# Patient Record
Sex: MALE | Race: WHITE | Employment: OTHER | URBAN - METROPOLITAN AREA
[De-identification: names, ages, dates, MRNs, and addresses within clinical notes are randomized per-mention and may not be internally consistent; named-entity substitution may affect disease eponyms.]

---

## 2019-04-02 ENCOUNTER — HOSPITAL ENCOUNTER (OUTPATIENT)
Dept: VASCULAR LAB | Age: 74
Discharge: HOME | End: 2019-04-02
Payer: MEDICARE

## 2019-04-02 PROCEDURE — 93924 LWR XTR VASC STDY BILAT: CPT

## 2019-08-02 ENCOUNTER — HOSPITAL ENCOUNTER (OUTPATIENT)
Dept: MAMMOGRAPHY | Age: 74
Discharge: HOME | End: 2019-08-04
Payer: MEDICARE

## 2019-08-02 DIAGNOSIS — Z12.31 VISIT FOR SCREENING MAMMOGRAM: ICD-10-CM

## 2019-08-02 DIAGNOSIS — M85.89 OTHER SPECIFIED DISORDERS OF BONE DENSITY AND STRUCTURE, MULTIPLE SITES: ICD-10-CM

## 2019-08-02 PROCEDURE — 77080 DXA BONE DENSITY AXIAL: CPT

## 2019-09-17 ENCOUNTER — APPOINTMENT (OUTPATIENT)
Dept: CT IMAGING | Age: 74
End: 2019-09-17
Payer: MEDICARE

## 2019-09-17 ENCOUNTER — HOSPITAL ENCOUNTER (EMERGENCY)
Age: 74
Discharge: HOME | End: 2019-09-17
Attending: EMERGENCY MEDICINE
Payer: MEDICARE

## 2019-09-17 ENCOUNTER — APPOINTMENT (OUTPATIENT)
Dept: GENERAL RADIOLOGY | Age: 74
End: 2019-09-17
Payer: MEDICARE

## 2019-09-17 VITALS
TEMPERATURE: 98.2 F | RESPIRATION RATE: 14 BRPM | WEIGHT: 190 LBS | SYSTOLIC BLOOD PRESSURE: 133 MMHG | OXYGEN SATURATION: 95 % | BODY MASS INDEX: 27.2 KG/M2 | HEIGHT: 70 IN | DIASTOLIC BLOOD PRESSURE: 87 MMHG | HEART RATE: 61 BPM

## 2019-09-17 DIAGNOSIS — T50.Z95A IMMUNIZATION REACTION, INITIAL ENCOUNTER: ICD-10-CM

## 2019-09-17 DIAGNOSIS — M25.511 ACUTE PAIN OF RIGHT SHOULDER: ICD-10-CM

## 2019-09-17 DIAGNOSIS — R42 DIZZINESS: Primary | ICD-10-CM

## 2019-09-17 LAB
-: ABNORMAL
ABSOLUTE EOS #: 0.3 K/UL (ref 0–0.4)
ABSOLUTE IMMATURE GRANULOCYTE: ABNORMAL K/UL (ref 0–0.3)
ABSOLUTE LYMPH #: 0.9 K/UL (ref 1–4.8)
ABSOLUTE MONO #: 0.6 K/UL (ref 0.1–1.2)
AMORPHOUS: ABNORMAL
ANION GAP SERPL CALCULATED.3IONS-SCNC: 12 MMOL/L (ref 9–17)
BACTERIA: ABNORMAL
BASOPHILS # BLD: 1 % (ref 0–2)
BASOPHILS ABSOLUTE: 0.1 K/UL (ref 0–0.2)
BILIRUBIN URINE: NEGATIVE
BUN BLDV-MCNC: 22 MG/DL (ref 8–23)
BUN/CREAT BLD: ABNORMAL (ref 9–20)
CALCIUM SERPL-MCNC: 9.3 MG/DL (ref 8.6–10.4)
CASTS UA: ABNORMAL /LPF
CHLORIDE BLD-SCNC: 102 MMOL/L (ref 98–107)
CO2: 26 MMOL/L (ref 20–31)
COLOR: YELLOW
COMMENT UA: ABNORMAL
CREAT SERPL-MCNC: 0.96 MG/DL (ref 0.7–1.2)
CRYSTALS, UA: ABNORMAL /HPF
DIFFERENTIAL TYPE: ABNORMAL
EOSINOPHILS RELATIVE PERCENT: 4 % (ref 1–4)
EPITHELIAL CELLS UA: ABNORMAL /HPF
GFR AFRICAN AMERICAN: >60 ML/MIN
GFR NON-AFRICAN AMERICAN: >60 ML/MIN
GFR SERPL CREATININE-BSD FRML MDRD: ABNORMAL ML/MIN/{1.73_M2}
GFR SERPL CREATININE-BSD FRML MDRD: ABNORMAL ML/MIN/{1.73_M2}
GLUCOSE BLD-MCNC: 102 MG/DL (ref 70–99)
GLUCOSE URINE: NEGATIVE
HCT VFR BLD CALC: 46.8 % (ref 41–53)
HEMOGLOBIN: 15.9 G/DL (ref 13.5–17.5)
IMMATURE GRANULOCYTES: ABNORMAL %
KETONES, URINE: ABNORMAL
LEUKOCYTE ESTERASE, URINE: NEGATIVE
LYMPHOCYTES # BLD: 15 % (ref 24–44)
MCH RBC QN AUTO: 32.8 PG (ref 26–34)
MCHC RBC AUTO-ENTMCNC: 34 G/DL (ref 31–37)
MCV RBC AUTO: 96.5 FL (ref 80–100)
MONOCYTES # BLD: 11 % (ref 2–11)
MUCUS: ABNORMAL
NITRITE, URINE: NEGATIVE
NRBC AUTOMATED: ABNORMAL PER 100 WBC
OTHER OBSERVATIONS UA: ABNORMAL
PDW BLD-RTO: 13.2 % (ref 12.5–15.4)
PH UA: 5.5
PLATELET # BLD: 166 K/UL (ref 140–450)
PLATELET ESTIMATE: ABNORMAL
PMV BLD AUTO: 8.2 FL (ref 6–12)
POTASSIUM SERPL-SCNC: 3.9 MMOL/L (ref 3.7–5.3)
PROTEIN UA: ABNORMAL
RBC # BLD: 4.85 M/UL (ref 4.5–5.9)
RBC # BLD: ABNORMAL 10*6/UL
RBC UA: ABNORMAL /HPF
RENAL EPITHELIAL, UA: ABNORMAL /HPF
SEG NEUTROPHILS: 69 % (ref 36–66)
SEGMENTED NEUTROPHILS ABSOLUTE COUNT: 4.2 K/UL (ref 1.8–7.7)
SODIUM BLD-SCNC: 140 MMOL/L (ref 135–144)
SPECIFIC GRAVITY UA: 1.02
TRICHOMONAS: ABNORMAL
TROPONIN INTERP: NORMAL
TROPONIN T: NORMAL NG/ML
TROPONIN, HIGH SENSITIVITY: 10 NG/L (ref 0–22)
TURBIDITY: CLEAR
URINE HGB: NEGATIVE
UROBILINOGEN, URINE: NORMAL
WBC # BLD: 6.1 K/UL (ref 3.5–11)
WBC # BLD: ABNORMAL 10*3/UL
WBC UA: ABNORMAL /HPF
YEAST: ABNORMAL

## 2019-09-17 PROCEDURE — 99284 EMERGENCY DEPT VISIT MOD MDM: CPT

## 2019-09-17 PROCEDURE — 6360000002 HC RX W HCPCS: Performed by: EMERGENCY MEDICINE

## 2019-09-17 PROCEDURE — 80048 BASIC METABOLIC PNL TOTAL CA: CPT

## 2019-09-17 PROCEDURE — 81001 URINALYSIS AUTO W/SCOPE: CPT

## 2019-09-17 PROCEDURE — 71045 X-RAY EXAM CHEST 1 VIEW: CPT

## 2019-09-17 PROCEDURE — 73030 X-RAY EXAM OF SHOULDER: CPT

## 2019-09-17 PROCEDURE — 85025 COMPLETE CBC W/AUTO DIFF WBC: CPT

## 2019-09-17 PROCEDURE — 93005 ELECTROCARDIOGRAM TRACING: CPT | Performed by: EMERGENCY MEDICINE

## 2019-09-17 PROCEDURE — 96374 THER/PROPH/DIAG INJ IV PUSH: CPT

## 2019-09-17 PROCEDURE — 96375 TX/PRO/DX INJ NEW DRUG ADDON: CPT

## 2019-09-17 PROCEDURE — 84484 ASSAY OF TROPONIN QUANT: CPT

## 2019-09-17 PROCEDURE — 70450 CT HEAD/BRAIN W/O DYE: CPT

## 2019-09-17 PROCEDURE — 36415 COLL VENOUS BLD VENIPUNCTURE: CPT

## 2019-09-17 PROCEDURE — 6370000000 HC RX 637 (ALT 250 FOR IP): Performed by: EMERGENCY MEDICINE

## 2019-09-17 PROCEDURE — 2580000003 HC RX 258: Performed by: EMERGENCY MEDICINE

## 2019-09-17 RX ORDER — KETOROLAC TROMETHAMINE 30 MG/ML
30 INJECTION, SOLUTION INTRAMUSCULAR; INTRAVENOUS ONCE
Status: COMPLETED | OUTPATIENT
Start: 2019-09-17 | End: 2019-09-17

## 2019-09-17 RX ORDER — MECLIZINE HYDROCHLORIDE 25 MG/1
25 TABLET ORAL 3 TIMES DAILY PRN
Qty: 20 TABLET | Refills: 0 | Status: SHIPPED | OUTPATIENT
Start: 2019-09-17

## 2019-09-17 RX ORDER — 0.9 % SODIUM CHLORIDE 0.9 %
1000 INTRAVENOUS SOLUTION INTRAVENOUS ONCE
Status: COMPLETED | OUTPATIENT
Start: 2019-09-17 | End: 2019-09-17

## 2019-09-17 RX ORDER — ONDANSETRON 2 MG/ML
4 INJECTION INTRAMUSCULAR; INTRAVENOUS ONCE
Status: COMPLETED | OUTPATIENT
Start: 2019-09-17 | End: 2019-09-17

## 2019-09-17 RX ORDER — MECLIZINE HCL 12.5 MG/1
25 TABLET ORAL ONCE
Status: COMPLETED | OUTPATIENT
Start: 2019-09-17 | End: 2019-09-17

## 2019-09-17 RX ORDER — HYDROCODONE BITARTRATE AND ACETAMINOPHEN 5; 325 MG/1; MG/1
2 TABLET ORAL ONCE
Status: COMPLETED | OUTPATIENT
Start: 2019-09-17 | End: 2019-09-17

## 2019-09-17 RX ORDER — HYDROCODONE BITARTRATE AND ACETAMINOPHEN 5; 325 MG/1; MG/1
1 TABLET ORAL EVERY 6 HOURS PRN
Qty: 10 TABLET | Refills: 0 | Status: SHIPPED | OUTPATIENT
Start: 2019-09-17 | End: 2019-09-20

## 2019-09-17 RX ADMIN — SODIUM CHLORIDE 1000 ML: 9 INJECTION, SOLUTION INTRAVENOUS at 11:33

## 2019-09-17 RX ADMIN — ONDANSETRON 4 MG: 2 INJECTION INTRAMUSCULAR; INTRAVENOUS at 11:33

## 2019-09-17 RX ADMIN — SODIUM CHLORIDE 1000 ML: 9 INJECTION, SOLUTION INTRAVENOUS at 10:08

## 2019-09-17 RX ADMIN — MECLIZINE 25 MG: 12.5 TABLET ORAL at 12:03

## 2019-09-17 RX ADMIN — HYDROCODONE BITARTRATE AND ACETAMINOPHEN 2 TABLET: 5; 325 TABLET ORAL at 12:03

## 2019-09-17 RX ADMIN — KETOROLAC TROMETHAMINE 30 MG: 30 INJECTION, SOLUTION INTRAMUSCULAR at 10:09

## 2019-09-17 ASSESSMENT — PAIN DESCRIPTION - PAIN TYPE
TYPE: ACUTE PAIN

## 2019-09-17 ASSESSMENT — PAIN DESCRIPTION - LOCATION: LOCATION: ARM

## 2019-09-17 ASSESSMENT — PAIN SCALES - GENERAL
PAINLEVEL_OUTOF10: 10
PAINLEVEL_OUTOF10: 10
PAINLEVEL_OUTOF10: 5
PAINLEVEL_OUTOF10: 10

## 2019-09-17 ASSESSMENT — PAIN DESCRIPTION - ORIENTATION: ORIENTATION: RIGHT

## 2019-09-17 ASSESSMENT — PAIN DESCRIPTION - PROGRESSION
CLINICAL_PROGRESSION: NOT CHANGED
CLINICAL_PROGRESSION: RAPIDLY IMPROVING

## 2019-09-17 NOTE — ED PROVIDER NOTES
Efforts were made to edit the dictations but occasionally words are mis-transcribed.)    Daneyl Villar,, DO  Attending Emergency Physician       [address] [place] [address], DO  09/17/19 9638

## 2019-09-17 NOTE — ED NOTES
Pt states he prefers to wait to have chest xray completed until he has had pain medication.         Karlos Guevara RN  09/17/19 5575

## 2019-09-18 LAB
EKG ATRIAL RATE: 57 BPM
EKG P AXIS: 2 DEGREES
EKG P-R INTERVAL: 224 MS
EKG Q-T INTERVAL: 410 MS
EKG QRS DURATION: 100 MS
EKG QTC CALCULATION (BAZETT): 399 MS
EKG R AXIS: -37 DEGREES
EKG T AXIS: 11 DEGREES
EKG VENTRICULAR RATE: 57 BPM

## 2023-01-04 ENCOUNTER — APPOINTMENT (OUTPATIENT)
Dept: CT IMAGING | Age: 78
End: 2023-01-04
Payer: MEDICARE

## 2023-01-04 ENCOUNTER — HOSPITAL ENCOUNTER (EMERGENCY)
Age: 78
Discharge: HOME | End: 2023-01-04
Attending: EMERGENCY MEDICINE
Payer: MEDICARE

## 2023-01-04 VITALS
WEIGHT: 184 LBS | DIASTOLIC BLOOD PRESSURE: 84 MMHG | RESPIRATION RATE: 16 BRPM | HEART RATE: 65 BPM | OXYGEN SATURATION: 94 % | TEMPERATURE: 97.5 F | HEIGHT: 70 IN | SYSTOLIC BLOOD PRESSURE: 129 MMHG | BODY MASS INDEX: 26.34 KG/M2

## 2023-01-04 DIAGNOSIS — S02.2XXA CLOSED FRACTURE OF NASAL BONE, INITIAL ENCOUNTER: Primary | ICD-10-CM

## 2023-01-04 PROCEDURE — 6370000000 HC RX 637 (ALT 250 FOR IP)

## 2023-01-04 PROCEDURE — 72125 CT NECK SPINE W/O DYE: CPT

## 2023-01-04 PROCEDURE — 90715 TDAP VACCINE 7 YRS/> IM: CPT

## 2023-01-04 PROCEDURE — 70450 CT HEAD/BRAIN W/O DYE: CPT

## 2023-01-04 PROCEDURE — 70486 CT MAXILLOFACIAL W/O DYE: CPT

## 2023-01-04 PROCEDURE — 99284 EMERGENCY DEPT VISIT MOD MDM: CPT

## 2023-01-04 PROCEDURE — 6360000002 HC RX W HCPCS

## 2023-01-04 PROCEDURE — 90471 IMMUNIZATION ADMIN: CPT

## 2023-01-04 RX ORDER — LIDOCAINE HYDROCHLORIDE 10 MG/ML
5 INJECTION, SOLUTION INFILTRATION; PERINEURAL ONCE
Status: DISCONTINUED | OUTPATIENT
Start: 2023-01-04 | End: 2023-01-04 | Stop reason: HOSPADM

## 2023-01-04 RX ORDER — ASPIRIN 81 MG/1
TABLET ORAL
COMMUNITY
Start: 2022-10-11

## 2023-01-04 RX ORDER — ACETAMINOPHEN 500 MG
1000 TABLET ORAL ONCE
Status: COMPLETED | OUTPATIENT
Start: 2023-01-04 | End: 2023-01-04

## 2023-01-04 RX ORDER — 0.9 % SODIUM CHLORIDE 0.9 %
500 INTRAVENOUS SOLUTION INTRAVENOUS ONCE
Status: DISCONTINUED | OUTPATIENT
Start: 2023-01-04 | End: 2023-01-04 | Stop reason: HOSPADM

## 2023-01-04 RX ADMIN — ACETAMINOPHEN 1000 MG: 500 TABLET ORAL at 16:29

## 2023-01-04 RX ADMIN — TETANUS TOXOID, REDUCED DIPHTHERIA TOXOID AND ACELLULAR PERTUSSIS VACCINE, ADSORBED 0.5 ML: 5; 2.5; 8; 8; 2.5 SUSPENSION INTRAMUSCULAR at 17:47

## 2023-01-04 ASSESSMENT — ENCOUNTER SYMPTOMS
VOMITING: 0
CONSTIPATION: 0
SHORTNESS OF BREATH: 0
CHEST TIGHTNESS: 0
SINUS PAIN: 0
SINUS PRESSURE: 0
NAUSEA: 0
VOICE CHANGE: 0
ABDOMINAL PAIN: 0
BACK PAIN: 0
COUGH: 0
SORE THROAT: 0
DIARRHEA: 0
BLOOD IN STOOL: 0
FACIAL SWELLING: 1

## 2023-01-04 NOTE — DISCHARGE INSTRUCTIONS
Sleep elevated to help reduce swelling. Take tylenol and motrin OTC for pain and swelling. Ice 20 minutes on and 20 minutes off. You can apply a thin layer of bacitracin. PLEASE RETURN TO THE EMERGENCY DEPARTMENT IMMEDIATELY if your symptoms worsen in anyway or in 8-12 hours if not improved for re-evaluation. You should immediately return to the ER for symptoms such as increasing pain, bloody stool, fever, a feeling of passing out, light headed, dizziness, chest pain, shortness of breath, persistent nausea and/or vomiting, numbness or weakness to the arms or legs, coolness or color change of the arms or legs. Take your medication as indicated and prescribed. If you are given an antibiotic then, make sure you get the prescription filled and take the antibiotics until finished. Tiffany Bradshaw!!!    From [facility] ([facility]) and [address] [place] [address] Emergency Services    On behalf of the Emergency Department staff at [facility]), I would like to thank you for giving us the opportunity to address your health care needs and concerns. We hope that during your visit, our service was delivered in a professional and caring manner. Please keep [facility] ([facility]) in mind as we walk with you down the path to your own personal wellness. Please expect an automated text message or email from us so we can ask a few questions about your health and progress. Based on your answers, a clinician may call you back to offer help and instructions. Please understand that early in the process of an illness or injury, an emergency department workup can be falsely reassuring. If you notice any worsening, changing or persistent symptoms please call your family doctor or return to the ER immediately. Tell us how we did during your visit at [email or web address]   and let us know about your experience

## 2023-01-04 NOTE — ED PROVIDER NOTES
[facility] Emergency Department  [address] [address] [facility] [address] [facility] [address]  Phone: [phone]  Fax: [phone]        Pt Name: Bella Spear  MRN: [id number]  [place] 1945  Date of evaluation: 1/4/23    [address] [place] [address]       Chief Complaint   Patient presents with    Fall    Facial Injury    Shoulder Pain     EMS reports pt fell while walking outside, landed on face, has abrasions on nose and laceration above upper lip, right shoulder pain resolved. HISTORY OF PRESENT ILLNESS (Location/Symptom, Timing/Onset, Context/Setting, Quality, Duration, Modifying Factors, Severity)      Bella Spear is a 69 y.o. male who presents to the ED via fall while out on his daily walk. Patient denies any dizziness chest pain or shortness of breath. Reports that he was walking too fast when he lost his balance and fell landing on his hands and face. Patient denies any loss of consciousness, states that he remembers the entire event. Patient does have a history of a coronary bypass 90 days ago. Patient is concerned that when he hit his nose and face that his upper teeth may have punctured through his upper lip. Bleeding is controlled at this time and the patient is holding an ice pack over his nose. PAST MEDICAL / SURGICAL / SOCIAL / FAMILY HISTORY     PMH:  has a past medical history of Acquired keratoderma, Acute bronchitis, Chronic renal impairment, Chronic rhinitis, Cough, Cough, Dermatophytosis of body, Disorder of male genital organ, Disorder of shoulder joint, Dyspnea, Enthesopathy, Essential hypertension, External hemorrhoids, Glaucoma, Hearing loss, Hemorrhoids, HLD (hyperlipidemia), Impacted cerumen, Inguinal hernia, Migraine with aura, Onychomycosis due to dermatophyte, PAD (peripheral artery disease) ([address] Pityriasis versicolor, Sarcoidosis, Upper respiratory infection, and Wears glasses.   Surgical History:  has a past surgical history that includes Nose surgery (12/11/13); hernia repair (2005); Colonoscopy (2003); Tonsillectomy and adenoidectomy (1951); and Hernia repair (2015). Social History:  reports that he has quit smoking. He has never used smokeless tobacco. He reports current alcohol use. He reports that he does not use drugs. Family History: He indicated that the status of his maternal grandfather is unknown. He indicated that the status of his son is unknown.   family history includes Asthma in his son; Diabetes in his maternal grandfather. Psychiatric History: None    Allergies: Percocet [oxycodone-acetaminophen]    Home Medications:   Prior to Admission medications    Medication Sig Start Date End Date Taking? Authorizing Provider   aspirin 81 MG EC tablet 1 tablet Orally Once a day for 30 day(s) 10/11/22  Yes Historical Provider, MD   meclizine (ANTIVERT) 25 MG tablet Take 1 tablet by mouth 3 times daily as needed for Dizziness 9/17/19   Nitin Villar,    ibuprofen (ADVIL;MOTRIN) 600 MG tablet Take 1 tablet by mouth every 6 hours as needed for Pain 10/2/15   Mireya Reilly MD   SIMVASTATIN PO Take 20 mg by mouth     Historical Provider, MD   LOSARTAN POTASSIUM PO Take by mouth    Historical Provider, MD   Multiple Vitamins-Minerals (CENTRUM PO) Take by mouth    Historical Provider, MD       REVIEW OF SYSTEMS  (2-9 systems for level 4, 10 ormore for level 5)      Review of Systems   Constitutional:  Negative for appetite change, chills, diaphoresis, fatigue and fever. HENT:  Positive for facial swelling and nosebleeds. Negative for congestion, drooling, ear pain, hearing loss, sinus pressure, sinus pain, sore throat and voice change. Respiratory:  Negative for cough, chest tightness and shortness of breath. Cardiovascular:  Negative for chest pain, palpitations and leg swelling. Gastrointestinal:  Negative for abdominal pain, blood in stool, constipation, diarrhea, nausea and vomiting.    Genitourinary:  Negative for dysuria, flank pain, frequency, hematuria and urgency. Musculoskeletal:  Negative for arthralgias, back pain, myalgias and neck pain. Skin:  Negative for rash. Neurological:  Negative for dizziness, speech difficulty, weakness, light-headedness, numbness and headaches. All other systems negative except as marked. PHYSICAL EXAM  (up to 7 for level 4, 8 or more for level 5)      INITIAL VITALS:  height is 5' 10\" (1.778 m) and weight is 83.5 kg (184 lb). His oral temperature is 97.5 °F (36.4 °C). His blood pressure is 129/84 and his pulse is 65. His respiration is 16 and oxygen saturation is 94%. Vital signs reviewed. Physical Exam  Vitals and nursing note reviewed. Constitutional:       General: He is not in acute distress. Appearance: Normal appearance. He is normal weight. He is not ill-appearing, toxic-appearing or diaphoretic. HENT:      Head: Normocephalic. Abrasion and laceration present. No raccoon eyes, Bull's sign, contusion or masses. Right Ear: Tympanic membrane, ear canal and external ear normal.      Left Ear: Tympanic membrane, ear canal and external ear normal.      Nose: Nasal deformity, signs of injury and nasal tenderness present. No congestion or rhinorrhea. Right Nostril: No foreign body, epistaxis, septal hematoma or occlusion. Left Nostril: Epistaxis (bleeding controlled after ice pack applied) present. No septal hematoma or occlusion. Comments: Swelling to the nose does not obstruct patient's airway. Mouth/Throat:      Mouth: Mucous membranes are moist.      Pharynx: Oropharynx is clear. Eyes:      General: No scleral icterus. Right eye: No discharge. Left eye: No discharge. Extraocular Movements: Extraocular movements intact. Pupils: Pupils are equal, round, and reactive to light. Cardiovascular:      Rate and Rhythm: Normal rate and regular rhythm. Pulses: Normal pulses. Heart sounds: Normal heart sounds.  No murmur heard.  Pulmonary:      Effort: Pulmonary effort is normal. No respiratory distress. Breath sounds: Normal breath sounds. No stridor. No wheezing, rhonchi or rales. Chest:      Chest wall: No tenderness. Abdominal:      General: Abdomen is flat. There is no distension. Palpations: Abdomen is soft. Tenderness: There is no abdominal tenderness. There is no right CVA tenderness, left CVA tenderness, guarding or rebound. Musculoskeletal:         General: No deformity or signs of injury. Normal range of motion. Cervical back: Normal range of motion and neck supple. Right lower leg: No edema. Left lower leg: No edema. Lymphadenopathy:      Cervical: No cervical adenopathy. Skin:     General: Skin is warm and dry. Capillary Refill: Capillary refill takes less than 2 seconds. Coloration: Skin is not jaundiced or pale. Findings: No rash. Neurological:      General: No focal deficit present. Mental Status: He is alert and oriented to person, place, and time. Cranial Nerves: No cranial nerve deficit. Sensory: No sensory deficit. Motor: No weakness. Gait: Gait normal.   Psychiatric:         Mood and Affect: Mood normal.         Behavior: Behavior normal.         Thought Content: Thought content normal.         DIFFERENTIAL DIAGNOSIS / MDM     Patient presents to the ED via EMS with complaint of a mechanical fall while on his daily walk. The patient states that he was walking too fast when he lost his balance and fell forward landing catching himself with his hands and landing on his face. Patient reports right shoulder pain and facial pain, bleeding is controlled with an ice pack. Patient reports having a coronary bypass surgery at [facility] on the 90 days ago. Currently denies any chest pain, shortness of breath, swelling dizziness, headache or vision changes.        PLAN (LABS / IMAGING / EKG)  Plan for CT head, CT neck C-spine, CT facial bones  Patient's abrasion to his nose and lips were cleaned it was obvious that there was no gaping laceration in need of a repair with sutures. Patient was instructed to clean with soap and water as he normally would and shower and use a thin layer of bacitracin over the abrasion to the bridge of the nose. MEDICATIONS ORDERED:  Tylenol 1,000 mg PO    Differential diagnosis considered: syncope, concussion, SDH, nasal bone fracture    Chronic Conditions affecting care (DM,HTN,CA, etc): coronary artery disease    Social Determinants of Health affecting care (unable to care for self, lives alone, unemployed, homeless,etc): none    History source(s) (patient,spouse,parent,family,friend,EMS,etc): patient and wife    Review of external sources (ECF,Hospital records,EMS report, radiology reports, etc): [facility] cardiology records    Tests considered but not ordered: An EKG and cardiac work-up were considered and not performed due to the patient denying any chest pain, shortness of breath, dizziness or loss of consciousness. Patient insists that this was a mechanical fall, loss of balance versus a possible syncopal episode    Independent interpretation of tests (eg.  X-ray, CAT scan, Doppler studies, EKG): CT scans were reviewed    Discussion of x-ray results with radiology: n/a    Consults: none    Consideration for admission/observation (even if discharged): This patient was not applicable due to negative CT scan, low level of pain controlled with Tylenol. The patient is comfortable being discharged home with wife and following up with ENT as needed outpatient. Prescription considerations: Patient is comfortable with over-the-counter Tylenol as needed for pain control. A discussion was had with the patient as far as additional need for prescription pain medication and he declined at this time.     Sepsis considered: n/a    Critical Care note written: n/a  DIAGNOSTIC RESULTS       RADIOLOGY: All images are read by the radiologist and their interpretations are reviewed. CT HEAD WO CONTRAST    Result Date: 1/4/2023  EXAMINATION: CT OF THE HEAD WITHOUT CONTRAST  1/4/2023 4:45 pm TECHNIQUE: CT of the head was performed without the administration of intravenous contrast. Automated exposure control, iterative reconstruction, and/or weight based adjustment of the mA/kV was utilized to reduce the radiation dose to as low as reasonably achievable. COMPARISON: 09/27/2019 HISTORY: ORDERING SYSTEM PROVIDED HISTORY: fall TECHNOLOGIST PROVIDED HISTORY: fall Decision Support Exception - unselect if not a suspected or confirmed emergency medical condition->Emergency Medical Condition (MA) Reason for Exam: fall Additional signs and symptoms: Pt states he fell, landing on his face. Abrasions to nose and above lip. FINDINGS: BRAIN/VENTRICLES: The ventricles and sulci are diffusely enlarged. Low attenuation is seen in the periventricular and subcortical white matter. No acute intracranial hemorrhage or acute infarct is identified ORBITS: The visualized portion of the orbits demonstrate no acute abnormality. SINUSES: The visualized paranasal sinuses and mastoid air cells demonstrate no acute abnormality. SOFT TISSUES/SKULL:  No acute abnormality of the visualized skull or soft tissues. No acute intracranial abnormality. Diffuse atrophic changes with findings suggesting chronic microvascular ischemia     CT FACIAL BONES WO CONTRAST    Result Date: 1/4/2023  EXAMINATION: CT OF THE FACE WITHOUT CONTRAST  1/4/2023 4:42 pm TECHNIQUE: CT of the face was performed without the administration of intravenous contrast. Multiplanar reformatted images are provided for review. Automated exposure control, iterative reconstruction, and/or weight based adjustment of the mA/kV was utilized to reduce the radiation dose to as low as reasonably achievable.  COMPARISON: None HISTORY: ORDERING SYSTEM PROVIDED HISTORY: fall TECHNOLOGIST PROVIDED HISTORY: fall Decision Support Exception - unselect if not a suspected or confirmed emergency medical condition->Emergency Medical Condition (MA) Reason for Exam: fall Additional signs and symptoms: Pt states he fell, landing on his face. Abrasions to nose and above lip. FINDINGS: FACIAL BONES: The frontal sinuses, orbital walls, maxilla, pterygoid plates, zygomatic arches, hard palate, and mandible are intact. Bilateral angulated superior nasal bone fractures. The temporomandibular joints are aligned. ORBITAL CONTENTS: The globes appear intact. The extraocular muscles, optic nerve sheath complexes and lacrimal glands appear unremarkable. No retrobulbar hematoma or mass is seen. SINUSES: There is no evidence of acute sinusitis, such as air fluid level. The mastoid air cells are clear. SOFT TISSUES: Soft tissue swelling overlying the nose. Bilateral angulated superior nasal bone fractures No other fracture identified RECOMMENDATIONS: Unavailable     CT CERVICAL SPINE WO CONTRAST    Result Date: 1/4/2023  EXAMINATION: CT OF THE CERVICAL SPINE WITHOUT CONTRAST 1/4/2023 4:42 pm TECHNIQUE: CT of the cervical spine was performed without the administration of intravenous contrast. Multiplanar reformatted images are provided for review. Automated exposure control, iterative reconstruction, and/or weight based adjustment of the mA/kV was utilized to reduce the radiation dose to as low as reasonably achievable. COMPARISON: None. HISTORY: ORDERING SYSTEM PROVIDED HISTORY: fall TECHNOLOGIST PROVIDED HISTORY: fall Decision Support Exception - unselect if not a suspected or confirmed emergency medical condition->Emergency Medical Condition (MA) Reason for Exam: fall Additional signs and symptoms: Pt states he fell, landing on his face. Abrasions to nose and above lip. FINDINGS: BONES/ALIGNMENT: There is no acute fracture or traumatic malalignment.  DEGENERATIVE CHANGES: Multilevel degenerative changes SOFT TISSUES: There is no prevertebral soft tissue swelling. No acute abnormality of the cervical spine. No results found. LABS:  No results found for this visit on 01/04/23. EMERGENCY DEPARTMENT COURSE           Vitals:    Vitals:    01/04/23 1600 01/04/23 1730   BP: (!) 139/92 129/84   Pulse: 76 65   Resp: 18 16   Temp: 97.5 °F (36.4 °C)    TempSrc: Oral    SpO2: 96% 94%   Weight: 83.5 kg (184 lb)    Height: 5' 10\" (1.778 m)      -------------------------  BP: 129/84, Temp: 97.5 °F (36.4 °C), Heart Rate: 65, Resp: 16      RE-EVALUATION:  See ED Course notes above. The patient and/or family and I have discussed the diagnosis and risks, and we agree with discharging home to follow-up with their pertinent providers. The patient appears stable for discharge and has been instructed to return immediately for new concerning symptoms or if the symptoms worsen in any way. The patient understands that at this time there is no evidence for a more malignant underlying process, but the patient also understands that early in the process of an illness or injury, an emergency department workup can be falsely reassuring. Routine discharge counseling was given, and the patient understands that worsening, changing or persistent symptoms should prompt an immediate call or follow up with their primary physician or return to the emergency department. I have reviewed the disposition diagnosis with the patient and or their family/guardian. I have answered their questions and given discharge instructions. They voiced understanding of these instructions and did not have any further questions or complaints. This patient was seen by the attending physician and they agreed with the assessment and plan. CONSULTS:  None    PROCEDURES:  None    FINAL IMPRESSION      1. Closed fracture of nasal bone, initial encounter          DISPOSITION / PLAN     CONDITION ON DISPOSITION:   Good / Stable for discharge.      PATIENT REFERRED TO:  Franklyn Cole Tawanda Lopez MD  [address]  [place] [address] [phone]    Schedule an appointment as soon as possible for a visit   As needed, If symptoms worsen    [facility] Emergency Department  [address]   [address] [facility] [address]  [phone]  Go to   As needed, If symptoms worsen    Samia Bruno MD  [address] [facility]  [phone]    Schedule an appointment as soon as possible for a visit   For wound re-check    DISCHARGE MEDICATIONS:  Discharge Medication List as of 1/4/2023  5:52 PM          SHIRLEY Miller CNP   Emergency Medicine Nurse Practitioner    (Please note that portions of this note were completed with a voice recognition program.  Efforts were made to edit the dictations but occasionally words aremis-transcribed.)       SHIRLEY Miller CNP  01/04/23 3557

## 2023-01-04 NOTE — ED PROVIDER NOTES
Emergency Department           COMPLAINT       Chief Complaint   Patient presents with    Fall    Facial Injury    Shoulder Pain     EMS reports pt fell while walking outside, landed on face, has abrasions on nose and laceration above upper lip, right shoulder pain resolved. PHYSICAL EXAM      ED Triage Vitals [01/04/23 1600]   BP Temp Temp Source Heart Rate Resp SpO2 Height Weight   (!) 139/92 97.5 °F (36.4 °C) Oral 76 18 96 % 5' 10\" (1.778 m) 184 lb (83.5 kg)     Constitutional: Alert, oriented x3, nontoxic, answering questions appropriately, acting properly for age, in no acute distress   HEENT: Extraocular muscles intact, mucus membranes moist, no posterior pharyngeal erythema or exudates, Pupils equal, round, reactive to light, 2 small vertical lacerations to his upper lip. Nasal swelling no nasal septal hematoma. Abrasion to the forehead bridge of the nose nose and upper lip. Neck: Trachea midline no posterior midline neck tenderness to palpation  Cardiovascular: Regular rhythm and rate no murmurs   Respiratory: Clear to auscultation bilaterally no wheezes, rhonchi, rales, no respiratory distress no tachypnea no retractions no hypoxia  Gastrointestinal: Soft, nontender, nondistended, positive bowel sounds. No rebound, rigidity, or guarding. Musculoskeletal: No extremity pain or swelling   Neurologic: Moving all 4 extremities without difficulty there are no gross focal neurologic deficits   Skin: Warm and dry       Physical Exam  DIAGNOSTIC RESULTS     EKG: All EKG's are interpreted by the Emergency Department Physician who either signs or Co-signs this chart in the absence of a cardiologist.    Not indicated unless otherwise documented above or in the midlevel documentation    LABS:  No results found for this visit on 01/04/23.     Not indicated unless otherwise documented above or in the midlevel documentation    RADIOLOGY:   I reviewedthe radiologist interpretations:  [facility] Final Result   No acute intracranial abnormality. Diffuse atrophic changes with findings suggesting chronic microvascular   ischemia         CT FACIAL BONES WO CONTRAST   Final Result   Bilateral angulated superior nasal bone fractures      No other fracture identified      RECOMMENDATIONS:   Unavailable         CT CERVICAL SPINE WO CONTRAST   Final Result   No acute abnormality of the cervical spine. Not indicated unless otherwise documented above or in the midlevel documentation    EMERGENCY DEPARTMENT COURSE:     The patient was given the following medications:  Orders Placed This Encounter   Medications    acetaminophen (TYLENOL) tablet 1,000 mg    lidocaine 1 % injection 5 mL    0.9 % sodium chloride bolus        PERTINENT ATTENDING PHYSICIAN COMMENTS:    Mechanical fall walking he said he tried to slow down but could not fell forward and hit his face he was wearing glasses. Abrasions to his nose. Epistaxis and bit his lip. Denies any loss of consciousness no neck pain. He was having shoulder pain that is now resolved. Tylenol for pain. CT head facial bones and cervical spine    5:20 PM CAT scan of the head unremarkable [place] cervical spinal fracture. CT of the facial bones shows bilateral angulated superior nasal bone fractures. Follow-up with ENT. Return if worsening symptoms or any other concerns. Faculty Attestation    I performed a history and physical examination of the patient and discussed management with the mid level provideer. I reviewed the mid level provider's note and agree with the documented findings and plan of care. Any areas of disagreement are noted on the chart. I was personally present for the key portions of any procedures. I have documented in the chart those procedures where I was not present during the key portions. I have reviewed the emergency nurses triage note.  I agree with the chief complaint, past medical history, past surgical history, allergies, medications, social and family history as documented unless otherwise noted below. Documentation of the HPI, Physical Exam and Medical Decision Making performed by medical students or scribes is based on my personal performance of the HPI, PE and MDM. For Physician Assistant/ Nurse Practitioner cases/documentation I have personally evaluated this patient and have completed at least one if not all key elements of the E/M (history, physical exam, and MDM). Additional findings are as noted.        Bryn Cabrera,   01/04/23 1540